# Patient Record
(demographics unavailable — no encounter records)

---

## 2025-05-12 NOTE — HISTORY OF PRESENT ILLNESS
[Improved Health] : Improved health [Quality of Life] : Quality of life [FreeTextEntry1] : Medical Hx: BRCA2, anxiety/depression  Family Hx:T2D/Obesity   Physical with Dr. Carter. Labs in HIE.   Fluctuating weight most of her life.  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

## 2025-06-10 NOTE — HISTORY OF PRESENT ILLNESS
[FreeTextEntry1] : Medical Hx: BRCA2, anxiety/depression  Family Hx:T2D/Obesity   Physical with Dr. Carter. Labs in HIE.   Fluctuating weight most of her life.  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 site redness, localized. Good energy

## 2025-06-10 NOTE — ASSESSMENT
[FreeTextEntry1] :  Increase Zepbound to 5 mg weekly Continue with healthy eating & physical activity  RTO 4 wks

## 2025-07-07 NOTE — ASSESSMENT
[FreeTextEntry1] :  -7 lbs total Zepbound 5 mg weekly Ensure adequate protein in diet Continue with physical activity   RTO 8/2025

## 2025-07-07 NOTE — HISTORY OF PRESENT ILLNESS
[FreeTextEntry1] : Medical Hx: BRCA2, anxiety/depression  Family Hx:T2D/Obesity   Physical with Dr. Carter. Labs in HIE.   Fluctuating weight most of her life.  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 site redness, localized. Good energy  25: Zepbound 5 mg weekly; no s/e. Remains active.  Tanita: Fat%35.2>32.8%, Muscle mass 107.2>106.8lbs, Bone Mass 5.8>5.6lbs, BMR 1565.1547, visceral fat ratin>5

## 2025-07-16 NOTE — PHYSICAL EXAM
[Normocephalic] : normocephalic [Atraumatic] : atraumatic [Supple] : supple [No Supraclavicular Adenopathy] : no supraclavicular adenopathy [Examined in the supine and seated position] : examined in the supine and seated position [Symmetrical] : symmetrical [No dominant masses] : no dominant masses in right breast  [No dominant masses] : no dominant masses left breast [No Nipple Retraction] : no left nipple retraction [No Nipple Discharge] : no left nipple discharge [No Axillary Lymphadenopathy] : no left axillary lymphadenopathy [No Edema] : no edema [No Rashes] : no rashes [No Ulceration] : no ulceration [de-identified] : tan lines

## 2025-07-16 NOTE — CONSULT LETTER
[Dear  ___] : Dear ~ANKUR, [Courtesy Letter:] : I had the pleasure of seeing your patient, [unfilled], in my office today. [Please see my note below.] : Please see my note below. [Consult Closing:] : Thank you very much for allowing me to participate in the care of this patient.  If you have any questions, please do not hesitate to contact me. [Sincerely,] : Sincerely, [DrMark  ___] : Dr. ROMERO [FreeTextEntry3] : Cristiane [place] [address]

## 2025-07-16 NOTE — HISTORY OF PRESENT ILLNESS
[FreeTextEntry1] :  This is a 30 yo female, for risk assessment after testing with a pathogenic variant identified in BRCA 2 in April 2023 with [facility].   Dilma is a patient of Dr. Jaye Arce at [facility]. Her mother tested BRCA2+ and therefore the patient underwent testing. Dilma was found to have a pathogenic mutation in BRCA2 and a VUS in DICER1.   She has a severe allergy to gadolinium (Severe rash) and underwent NORMA 11/14/2024 at [facility], the breasts are heterogeneously dense- BIRADS 1. Her last BL US was 5/9/2025 at [facility]- BIRADS 1.  She has been getting NORMA and 6 month interval ultrasound with Dr. Arce. 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  She has no breast complaints today. She does SBE. She has not noticed a change in her breast or a breast lump. She has not noticed a change in her nipple or nipple area. She has not noticed a change in the skin of the breast. She is not experiencing nipple discharge. She is not experiencing breast pain. She has not noticed a lump or lymph node under the armpit.   BREAST CANCER RISK FACTORS Menarche: 10 LMP: 07/16/2025  Menopause: pre  Grav: 0  Para: 0  Age at first live birth: n/a  Nursed: n/a  Hysterectomy: no  Oophorectomy: no  OCP: yes Nuvaring  HRT: no  Last pap/pelvic exam: 05/2025 WNL  Related family history: denies breast, ovarian, pancreatic, melanoma and colon cancer Ashkenazi: no  Tyrer Sanazick v8 risk assessment: 63% Genetic testing: self, mother mat. uncle and brother BRCA2 positive  Bra size: 36DD    Last mammogram: 11/14/2024   NORMA       Location: [facility]  Report reviewed.     Images reviewed on PACS Results: BIRADS 1 LOLY: grade 0  The breasts are heterogeneously dense No mammographic evidence of malignancy.  Last ultrasound: 0509/2025     Location: [facility] Report reviewed.     Images reviewed on PACS Results: BIRADS 1  No sonographic evidence of malignancy.  Last MRI: 05/22/2024               Location: [facility] Report reviewed. Results: BIRADS 1  within the upper- inner aspect of the right breast there is a 6-7 mm thin line of persistent (type 1) enhancement which is probably benign. They are less prominent, scattered, tiny enhancing foci throughout both breasts which are probably benign.

## 2025-07-16 NOTE — HISTORY OF PRESENT ILLNESS
[FreeTextEntry1] :  This is a 30 yo female, for risk assessment after testing with a pathogenic variant identified in BRCA 2 in April 2023 with [facility].   Dilma is a patient of Dr. Jaye Arce at [facility]. Her mother tested BRCA2+ and therefore the patient underwent testing. Dilma was found to have a pathogenic mutation in BRCA2 and a VUS in DICER1.   She has a severe allergy to gadolinium (Severe rash) and underwent NORMA 11/14/2024 at [facility], the breasts are heterogeneously dense- BIRADS 1. Her last BL US was 5/9/2025 at [facility]- BIRADS 1.  She has been getting NORMA and 6 month interval ultrasound with Dr. Arce. 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  She has no breast complaints today. She does SBE. She has not noticed a change in her breast or a breast lump. She has not noticed a change in her nipple or nipple area. She has not noticed a change in the skin of the breast. She is not experiencing nipple discharge. She is not experiencing breast pain. She has not noticed a lump or lymph node under the armpit.   BREAST CANCER RISK FACTORS Menarche: 10 LMP: 07/16/2025  Menopause: pre  Grav: 0  Para: 0  Age at first live birth: n/a  Nursed: n/a  Hysterectomy: no  Oophorectomy: no  OCP: yes Nuvaring  HRT: no  Last pap/pelvic exam: 05/2025 WNL  Related family history: denies breast, ovarian, pancreatic, melanoma and colon cancer Ashkenazi: no  Tyrer Sanazick v8 risk assessment: 63% Genetic testing: self, mother mat. uncle and brother BRCA2 positive  Bra size: 36DD    Last mammogram: 11/14/2024   NORMA       Location: [facility]  Report reviewed.     Images reviewed on PACS Results: BIRADS 1 LOLY: grade 0  The breasts are heterogeneously dense No mammographic evidence of malignancy.  Last ultrasound: 0509/2025     Location: [facility] Report reviewed.     Images reviewed on PACS Results: BIRADS 1  No sonographic evidence of malignancy.  Last MRI: 05/22/2024               Location: [facility] Report reviewed. Results: BIRADS 1  within the upper- inner aspect of the right breast there is a 6-7 mm thin line of persistent (type 1) enhancement which is probably benign. They are less prominent, scattered, tiny enhancing foci throughout both breasts which are probably benign.

## 2025-07-16 NOTE — ASSESSMENT
[FreeTextEntry1] : 30 yo female with BRCA2+ reviewed her risk assessment We reviewed risk reduction strategies including maintaining a BMI <25, limiting red meat intake and alcoholic beverages to 3 per week and exercise (150 min/ week low intensity or 75 min/week high intensity). And maintaining a normal vitamin D level. discussed melanoma and pancreatic cancer screening  reviewed genetic counseling and she will go for formal post-test counseling once her insurance changes reviewed high risk screening given her gadolinium contrast allergy she has been getting NORMA and sonogram in 6 month intervals she is not currently interested in RRM referral to Dr. Leo once her insurance changes  She knows to call or return sooner should any concerns or questions arise.    show

## 2025-07-16 NOTE — CONSULT LETTER
[Dear  ___] : Dear ~ANKUR, [Courtesy Letter:] : I had the pleasure of seeing your patient, [unfilled], in my office today. [Please see my note below.] : Please see my note below. [Consult Closing:] : Thank you very much for allowing me to participate in the care of this patient.  If you have any questions, please do not hesitate to contact me. [Sincerely,] : Sincerely, [DrMark  ___] : Dr. ROEMRO [FreeTextEntry3] : Cristiane [place] [address]

## 2025-07-16 NOTE — PHYSICAL EXAM
[Normocephalic] : normocephalic [Atraumatic] : atraumatic [Supple] : supple [No Supraclavicular Adenopathy] : no supraclavicular adenopathy [Examined in the supine and seated position] : examined in the supine and seated position [Symmetrical] : symmetrical [No dominant masses] : no dominant masses in right breast  [No dominant masses] : no dominant masses left breast [No Nipple Retraction] : no left nipple retraction [No Nipple Discharge] : no left nipple discharge [No Axillary Lymphadenopathy] : no left axillary lymphadenopathy [No Edema] : no edema [No Rashes] : no rashes [No Ulceration] : no ulceration [de-identified] : tan lines

## 2025-07-16 NOTE — PHYSICAL EXAM
[Normocephalic] : normocephalic [Atraumatic] : atraumatic [Supple] : supple [No Supraclavicular Adenopathy] : no supraclavicular adenopathy [Examined in the supine and seated position] : examined in the supine and seated position [Symmetrical] : symmetrical [No dominant masses] : no dominant masses in right breast  [No dominant masses] : no dominant masses left breast [No Nipple Retraction] : no left nipple retraction [No Nipple Discharge] : no left nipple discharge [No Axillary Lymphadenopathy] : no left axillary lymphadenopathy [No Edema] : no edema [No Rashes] : no rashes [No Ulceration] : no ulceration [de-identified] : tan lines

## 2025-07-16 NOTE — HISTORY OF PRESENT ILLNESS
[FreeTextEntry1] :  This is a 30 yo female, for risk assessment after testing with a pathogenic variant identified in BRCA 2 in April 2023 with [facility].   Dilma is a patient of Dr. Jaye Arce at [facility]. Her mother tested BRCA2+ and therefore the patient underwent testing. Dilma was found to have a pathogenic mutation in BRCA2 and a VUS in DICER1.   She has a severe allergy to gadolinium (Severe rash) and underwent NORMA 11/14/2024 at [facility], the breasts are heterogeneously dense- BIRADS 1. Her last BL US was 5/9/2025 at [facility]- BIRADS 1.  She has been getting NORMA and 6 month interval ultrasound with Dr. Arce. 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  She has no breast complaints today. She does SBE. She has not noticed a change in her breast or a breast lump. She has not noticed a change in her nipple or nipple area. She has not noticed a change in the skin of the breast. She is not experiencing nipple discharge. She is not experiencing breast pain. She has not noticed a lump or lymph node under the armpit.   BREAST CANCER RISK FACTORS Menarche: 10 LMP: 07/16/2025  Menopause: pre  Grav: 0  Para: 0  Age at first live birth: n/a  Nursed: n/a  Hysterectomy: no  Oophorectomy: no  OCP: yes Nuvaring  HRT: no  Last pap/pelvic exam: 05/2025 WNL  Related family history: denies breast, ovarian, pancreatic, melanoma and colon cancer Ashkenazi: no  Tyrer Sanazick v8 risk assessment: 63% Genetic testing: self, mother mat. uncle and brother BRCA2 positive  Bra size: 36DD    Last mammogram: 11/14/2024   NORMA       Location: [facility]  Report reviewed.     Images reviewed on PACS Results: BIRADS 1 LOLY: grade 0  The breasts are heterogeneously dense No mammographic evidence of malignancy.  Last ultrasound: 0509/2025     Location: [facility] Report reviewed.     Images reviewed on PACS Results: BIRADS 1  No sonographic evidence of malignancy.  Last MRI: 05/22/2024               Location: [facility] Report reviewed. Results: BIRADS 1  within the upper- inner aspect of the right breast there is a 6-7 mm thin line of persistent (type 1) enhancement which is probably benign. They are less prominent, scattered, tiny enhancing foci throughout both breasts which are probably benign.

## 2025-07-16 NOTE — CONSULT LETTER
[Dear  ___] : Dear ~ANKUR, [Courtesy Letter:] : I had the pleasure of seeing your patient, [unfilled], in my office today. [Please see my note below.] : Please see my note below. [Consult Closing:] : Thank you very much for allowing me to participate in the care of this patient.  If you have any questions, please do not hesitate to contact me. [Sincerely,] : Sincerely, [DrMark  ___] : Dr. ROMERO [FreeTextEntry3] : Cristiane [place] [address]

## 2025-07-16 NOTE — ASSESSMENT
[FreeTextEntry1] : 32 yo female with BRCA2+ reviewed her risk assessment We reviewed risk reduction strategies including maintaining a BMI <25, limiting red meat intake and alcoholic beverages to 3 per week and exercise (150 min/ week low intensity or 75 min/week high intensity). And maintaining a normal vitamin D level. discussed melanoma and pancreatic cancer screening  reviewed genetic counseling and she will go for formal post-test counseling once her insurance changes reviewed high risk screening given her gadolinium contrast allergy she has been getting NORMA and sonogram in 6 month intervals she is not currently interested in RRM referral to Dr. Leo once her insurance changes  She knows to call or return sooner should any concerns or questions arise.